# Patient Record
Sex: FEMALE | Race: OTHER | Employment: FULL TIME | URBAN - METROPOLITAN AREA
[De-identification: names, ages, dates, MRNs, and addresses within clinical notes are randomized per-mention and may not be internally consistent; named-entity substitution may affect disease eponyms.]

---

## 2017-03-22 ENCOUNTER — TELEPHONE (OUTPATIENT)
Dept: NEUROLOGY | Facility: CLINIC | Age: 22
End: 2017-03-22

## 2017-03-22 ENCOUNTER — HOSPITAL ENCOUNTER (OUTPATIENT)
Age: 22
Discharge: HOME | End: 2017-03-22
Attending: EMERGENCY MEDICINE
Payer: COMMERCIAL

## 2017-03-22 VITALS
WEIGHT: 230 LBS | HEART RATE: 76 BPM | TEMPERATURE: 97 F | RESPIRATION RATE: 20 BRPM | HEIGHT: 68 IN | BODY MASS INDEX: 34.86 KG/M2 | SYSTOLIC BLOOD PRESSURE: 120 MMHG | OXYGEN SATURATION: 100 % | DIASTOLIC BLOOD PRESSURE: 74 MMHG

## 2017-03-22 DIAGNOSIS — R42 DIZZINESS: Primary | ICD-10-CM

## 2017-03-22 PROCEDURE — 99203 OFFICE O/P NEW LOW 30 MIN: CPT

## 2017-03-22 PROCEDURE — 99204 OFFICE O/P NEW MOD 45 MIN: CPT

## 2017-03-22 RX ORDER — MECLIZINE HYDROCHLORIDE CHEWABLE TABLETS 25 MG/1
1 TABLET, CHEWABLE ORAL 3 TIMES DAILY PRN
Qty: 30 TABLET | Refills: 0 | Status: SHIPPED | OUTPATIENT
Start: 2017-03-22 | End: 2017-03-22

## 2017-03-22 RX ORDER — MECLIZINE HYDROCHLORIDE CHEWABLE TABLETS 25 MG/1
1 TABLET, CHEWABLE ORAL 3 TIMES DAILY PRN
Qty: 42 TABLET | Refills: 0 | Status: SHIPPED | OUTPATIENT
Start: 2017-03-22 | End: 2017-04-05

## 2017-03-22 NOTE — ED PROVIDER NOTES
Patient Seen in: [address]    History   Patient presents with:  Blurred Vision    Stated Complaint: dizziness    HPI    22 yo F without PMH presenting for evaluation of one week of intermittent dizziness associated with c Ht 172.7 cm (5' 8\")  Wt 104. 327 kg  BMI 34.98 kg/m2  SpO2 100%  LMP 03/20/2017    Right Eye Chart Acuity: 20/20, Corrected    Left Eye Chart Acuity: 20/15, Corrected    Physical Exam   Constitutional: No distress. HEENT: MMM. Head: Normocephalic.    Eye evaluation. Medications Prescribed:  Current Discharge Medication List    START taking these medications    Meclizine HCl 25 MG Oral Chew Tab  Chew 1 tablet by mouth 3 (three) times daily as needed (dizziness).   Qty: 42 tablet Refills: 0

## 2017-03-22 NOTE — ED NOTES
Pt denies dizziness or lightheaded at this time. Denies nausea or vomiting at this time of episodes. Only reports blurred vision at this time.

## 2017-03-22 NOTE — ED INITIAL ASSESSMENT (HPI)
Pt reports has been feeling light headed intermittent for one week and has blurred vision during that time. Pt states happens either when changing positions or sitting still.  Denies any other complaints

## 2017-03-23 ENCOUNTER — TELEPHONE (OUTPATIENT)
Dept: NEUROLOGY | Facility: CLINIC | Age: 22
End: 2017-03-23

## 2017-03-23 ENCOUNTER — OFFICE VISIT (OUTPATIENT)
Dept: NEUROLOGY | Facility: CLINIC | Age: 22
End: 2017-03-23

## 2017-03-23 VITALS
OXYGEN SATURATION: 98 % | DIASTOLIC BLOOD PRESSURE: 72 MMHG | HEART RATE: 65 BPM | HEIGHT: 68 IN | WEIGHT: 230 LBS | SYSTOLIC BLOOD PRESSURE: 110 MMHG | BODY MASS INDEX: 34.86 KG/M2

## 2017-03-23 DIAGNOSIS — R27.0 ATAXIA: Primary | ICD-10-CM

## 2017-03-23 PROBLEM — R53.83 FATIGUE: Status: ACTIVE | Noted: 2017-03-23

## 2017-03-23 PROCEDURE — 99244 OFF/OP CNSLTJ NEW/EST MOD 40: CPT | Performed by: OTHER

## 2017-03-23 NOTE — PROGRESS NOTES
Dal Bamberger : 1995     HPI:   Patient presents with:  Neurologic Problem: New patient referred by Wesly Cortes. Pt c/o intermittent dizziness, nausea along with feeling lightheaded  for the past week.  C/O a \"Fuzzy feeling\" and she feels li History reviewed. No pertinent family history.    Social History:  Social History    Marital Status: Single              Spouse Name:                       Years of Education:                 Number of children:               Social History Main Topics XII- Tongue in midline, without atrophy. Motor: 5/5 strength in the upper and lower extremities. Tone normal. No pronator drift . Sensory: Intact to Vibration, temperature, fine touch, proprioception and pin prick in the UE and LE.  Stereognosis int

## 2017-03-23 NOTE — ED NOTES
Spoke with patient today after reporting that she was worse, states she did not get prescription for meclazine filled and that she scheduled an appointment for the neurologist for today as discussed with her discharge instructions.

## 2017-03-23 NOTE — PATIENT INSTRUCTIONS
Refill policies:    • Allow 2 business days for refills; controlled substances may take longer. • Contact our office at least 5 days prior to running out of medication or submit request through the “request refill” option in your [employer] account.   • Ref have a procedure or additional testing performed. [facility]) will contact your insurance carrier to obtain pre-certification or prior authorization.     Unfortunately, [facility] has seen an increase in denial of payment even though the p

## 2017-03-23 NOTE — TELEPHONE ENCOUNTER
Member's Plan does not require notification or prior authorization through the [facility] Notification or Prior Authorization program. Will call Pt. to inform. Pt. informed of approval. Scheduled MRI appt. Tomorrow at [address] [place] [address] location.

## 2017-03-24 ENCOUNTER — TELEPHONE (OUTPATIENT)
Dept: NEUROLOGY | Facility: CLINIC | Age: 22
End: 2017-03-24

## 2017-03-24 ENCOUNTER — HOSPITAL ENCOUNTER (OUTPATIENT)
Dept: MRI IMAGING | Age: 22
Discharge: HOME | End: 2017-03-24
Attending: Other
Payer: COMMERCIAL

## 2017-03-24 DIAGNOSIS — R27.0 ATAXIA: ICD-10-CM

## 2017-03-24 PROCEDURE — 70551 MRI BRAIN STEM W/O DYE: CPT

## 2017-03-24 NOTE — TELEPHONE ENCOUNTER
Called patient,no answer ,left detailed vm informing patient of  message below. \"If vomiting and dizziness persist, she should go to ER\". Informed patient in vm to please follow-up with our office if she has any further questions or concerns.

## 2017-03-24 NOTE — TELEPHONE ENCOUNTER
Mri of the brain done on 03/24/16. Results are not in yet. Pt was last seen on 03/23/17 for dizziness. Pt c/o of increased in nausea and vomiting since yesterday afternoon along with feeling lightheaded.  Pt would like to know at what point should she go to

## 2017-10-09 ENCOUNTER — HOSPITAL ENCOUNTER (OUTPATIENT)
Age: 22
Discharge: HOME | End: 2017-10-09
Attending: FAMILY MEDICINE
Payer: COMMERCIAL

## 2017-10-09 VITALS
SYSTOLIC BLOOD PRESSURE: 132 MMHG | RESPIRATION RATE: 18 BRPM | HEART RATE: 88 BPM | DIASTOLIC BLOOD PRESSURE: 86 MMHG | TEMPERATURE: 97 F | OXYGEN SATURATION: 99 %

## 2017-10-09 DIAGNOSIS — J03.90 ACUTE TONSILLITIS, UNSPECIFIED ETIOLOGY: Primary | ICD-10-CM

## 2017-10-09 PROCEDURE — 86308 HETEROPHILE ANTIBODY SCREEN: CPT | Performed by: FAMILY MEDICINE

## 2017-10-09 PROCEDURE — 99213 OFFICE O/P EST LOW 20 MIN: CPT

## 2017-10-09 PROCEDURE — 36415 COLL VENOUS BLD VENIPUNCTURE: CPT

## 2017-10-09 PROCEDURE — 87430 STREP A AG IA: CPT

## 2017-10-09 PROCEDURE — 99214 OFFICE O/P EST MOD 30 MIN: CPT

## 2017-10-09 RX ORDER — SPIRONOLACTONE 50 MG/1
50 TABLET, FILM COATED ORAL DAILY
COMMUNITY

## 2017-10-09 RX ORDER — ALPRAZOLAM 0.5 MG/1
0.5 TABLET ORAL NIGHTLY PRN
COMMUNITY

## 2017-10-09 RX ORDER — CEFDINIR 300 MG/1
300 CAPSULE ORAL 2 TIMES DAILY
Qty: 20 CAPSULE | Refills: 0 | Status: SHIPPED | OUTPATIENT
Start: 2017-10-09 | End: 2017-10-19

## 2017-10-09 RX ORDER — FLUOXETINE HYDROCHLORIDE 40 MG/1
40 CAPSULE ORAL DAILY
COMMUNITY

## 2017-10-09 NOTE — ED PROVIDER NOTES
Patient Seen in: [address]    History   No chief complaint on file. Stated Complaint: sore throat    HPI    Patient here with sore throat for 4 days. No travel,denies sick contacts .   Patient denies sig shortness of midline, no pointing, no stridor  NECK: supple, no adenopathy, no thyromegaly  LUNGS:  no resp distress, lungs clear bilateral  CARDIO: RRR without murmur  GI: soft, non-tender,   EXTREMITIES: no cyanosis, clubbing or edema    SKIN: good skin turgor, no ob

## 2017-10-09 NOTE — ED INITIAL ASSESSMENT (HPI)
Pt here to IC with c/o sorethroat that started 2 weeks ago, it went away then started back up  4 days ago. Resp easy and regular. No fevers, does c/o bilat ear pain. Throat is red, tonsils swollen with white patches. Strep swab obtained.

## (undated) NOTE — MR AVS SNAPSHOT
[facility]  [address], [address]  [phone]               Thank you for choosing us for your health care visit with Tabatha Adames MD, MD.  We are glad to serve you and happy to provide you with this summary of your or by on call physicians. ? By law, narcotics cannot be faxed or phoned into your pharmacy. The prescription must be signed by the provider, picked up in our office and physically brought to the pharmacy.   A 30 day supply with no refills is the maximum al approved and is a COVERED benefit. Although the [facility] staff does its due diligence, the insurance carrier gives the disclaimer that \"Although the procedure is authorized, this does not guarantee payment. \"    Ultimately the patient is responsible for payme Make half your plate fruits and vegetables Highly refined, white starches including white bread, rice and pasta   Eat plenty of protein, keep the fat content low Sugars:  sodas and sports drinks, candies and desserts   Eat plenty of low-fat dairy products

## (undated) NOTE — ED AVS SNAPSHOT
[facility]    [id number] [place] [id number]    Phone:  Raj Menjivar   MRN: [id number]    Department:  [facility]   Date of Visit:  3/22/2017 deductible, co-payment, or co-insurance and for other services not covered under your health insurance plan. Please contact your insurance company and physician's office to determine coverage and benefits available for follow-up care and referrals.      It continue to take your medications as instructed by your Primary Care doctor until you can check with your doctor. Please bring the medication list to your next doctor's appointment.     Any imaging studies and labs completed today can be reviewed in your M can help with your Affordable Care Act coverage, as well as all types of Medicaid plans. To get signed up and covered, please call [phone] and ask to get set up for an insurance coverage that is in-network with [facility]

## (undated) NOTE — Clinical Note
[id number] [facility]  [address], [address] [place] [address] [phone]        Dear Keely Moses, DO,      I had the pleasure of seeing your patient, Simón Mckenna on 3/23/2017.      Below please find a some palpitations in the past, but has not noted any palpitations with the current symptoms. Current Outpatient Prescriptions:  Meclizine HCl 25 MG Oral Chew Tab Chew 1 tablet by mouth 3 (three) times daily as needed (dizziness).  Disp: 42 tablet Rfl: times three. Recent and remote memory intact, with normal fund of knowledge. Cranial Nerves: II-Visual acuity grossly normal, with full visual fields. Pupil react to light. Fundoscopic exam normal.  III,IV,VI- EOM full, without nystagmus.  V-Facial sensa